# Patient Record
Sex: FEMALE | Race: BLACK OR AFRICAN AMERICAN
[De-identification: names, ages, dates, MRNs, and addresses within clinical notes are randomized per-mention and may not be internally consistent; named-entity substitution may affect disease eponyms.]

---

## 2017-03-08 ENCOUNTER — HOSPITAL ENCOUNTER (OUTPATIENT)
Dept: HOSPITAL 84 - D.LDO | Age: 27
Discharge: HOME | End: 2017-03-08
Attending: OBSTETRICS & GYNECOLOGY
Payer: MEDICAID

## 2017-03-08 VITALS — BODY MASS INDEX: 39.7 KG/M2

## 2017-03-08 DIAGNOSIS — R11.2: ICD-10-CM

## 2017-03-08 DIAGNOSIS — Z3A.30: ICD-10-CM

## 2017-03-08 DIAGNOSIS — Z34.03: Primary | ICD-10-CM

## 2017-03-08 LAB
ANION GAP SERPL CALC-SCNC: 12.5 MMOL/L (ref 8–16)
APPEARANCE UR: CLEAR
BASOPHILS NFR BLD AUTO: 0.1 % (ref 0–2)
BILIRUB SERPL-MCNC: NEGATIVE MG/DL
BUN SERPL-MCNC: 8 MG/DL (ref 7–18)
CALCIUM SERPL-MCNC: 8 MG/DL (ref 8.5–10.1)
CHLORIDE SERPL-SCNC: 102 MMOL/L (ref 98–107)
CO2 SERPL-SCNC: 24.6 MMOL/L (ref 21–32)
COLOR UR: YELLOW
CREAT SERPL-MCNC: 0.5 MG/DL (ref 0.6–1.3)
EOSINOPHIL NFR BLD: 0.6 % (ref 0–7)
ERYTHROCYTE [DISTWIDTH] IN BLOOD BY AUTOMATED COUNT: 13.9 % (ref 11.5–14.5)
FIBRONECTIN FETAL VAG QL: NEGATIVE
GLUCOSE SERPL-MCNC: 79 MG/DL (ref 74–106)
GLUCOSE SERPL-MCNC: NEGATIVE MG/DL
HCT VFR BLD CALC: 36.4 % (ref 36–48)
HGB BLD-MCNC: 13.1 G/DL (ref 12–16)
IMM GRANULOCYTES NFR BLD: 0.5 % (ref 0–5)
KETONES UR STRIP-MCNC: NEGATIVE MG/DL
LEUKOCYTE ESTERASE: NEGATIVE
LYMPHOCYTES NFR BLD AUTO: 6.5 % (ref 15–50)
MCH RBC QN AUTO: 31.6 PG (ref 26–34)
MCHC RBC AUTO-ENTMCNC: 36 G/DL (ref 31–37)
MCV RBC: 87.9 FL (ref 80–100)
MONOCYTES NFR BLD: 6.4 % (ref 2–11)
NEUTROPHILS NFR BLD AUTO: 85.9 % (ref 40–80)
NITRITE UR-MCNC: NEGATIVE MG/ML
OSMOLALITY SERPL CALC.SUM OF ELEC: 266 MOSM/KG (ref 275–300)
PH UR STRIP: 7 [PH] (ref 5–6)
PLATELET # BLD: 190 10X3/UL (ref 130–400)
PMV BLD AUTO: 11.3 FL (ref 7.4–10.4)
POTASSIUM SERPL-SCNC: 4.1 MMOL/L (ref 3.5–5.1)
PROT UR-MCNC: NEGATIVE MG/DL
RBC # BLD AUTO: 4.14 10X6/UL (ref 4–5.4)
SODIUM SERPL-SCNC: 135 MMOL/L (ref 136–145)
SP GR UR STRIP: 1 (ref 1–1.02)
UROBILINOGEN UR-MCNC: NORMAL MG/DL
WBC # BLD AUTO: 12.4 10X3/UL (ref 4.8–10.8)

## 2017-03-22 ENCOUNTER — HOSPITAL ENCOUNTER (OUTPATIENT)
Dept: HOSPITAL 84 - D.LDO | Age: 27
Discharge: HOME | End: 2017-03-22
Attending: OBSTETRICS & GYNECOLOGY
Payer: MEDICAID

## 2017-03-22 VITALS — BODY MASS INDEX: 39.7 KG/M2

## 2017-03-22 DIAGNOSIS — O26.899: Primary | ICD-10-CM

## 2017-03-22 LAB
APPEARANCE UR: (no result)
BACTERIA #/AREA URNS HPF: (no result) /HPF
BILIRUB SERPL-MCNC: NEGATIVE MG/DL
COLOR UR: YELLOW
GLUCOSE SERPL-MCNC: NEGATIVE MG/DL
KETONES UR STRIP-MCNC: NEGATIVE MG/DL
LEUKOCYTE ESTERASE: (no result)
MUCOUS THREADS #/AREA URNS LPF: (no result) /LPF
NITRITE UR-MCNC: NEGATIVE MG/ML
PH UR STRIP: 9 [PH] (ref 5–6)
PROT UR-MCNC: NEGATIVE MG/DL
RBC #/AREA URNS HPF: (no result) /HPF (ref 0–5)
SP GR UR STRIP: 1 (ref 1–1.02)
SQUAMOUS #/AREA URNS HPF: (no result) /HPF (ref 0–5)
UROBILINOGEN UR-MCNC: NORMAL MG/DL
WBC #/AREA URNS HPF: (no result) /HPF (ref 0–5)

## 2017-04-21 ENCOUNTER — HOSPITAL ENCOUNTER (INPATIENT)
Dept: HOSPITAL 84 - D.LDO | Age: 27
LOS: 1 days | Discharge: HOME | End: 2017-04-22
Attending: OBSTETRICS & GYNECOLOGY | Admitting: OBSTETRICS & GYNECOLOGY
Payer: MEDICAID

## 2017-04-21 VITALS
HEIGHT: 60 IN | WEIGHT: 203 LBS | BODY MASS INDEX: 39.85 KG/M2 | WEIGHT: 203 LBS | BODY MASS INDEX: 39.85 KG/M2 | HEIGHT: 60 IN

## 2017-04-21 VITALS — SYSTOLIC BLOOD PRESSURE: 130 MMHG | DIASTOLIC BLOOD PRESSURE: 75 MMHG

## 2017-04-21 DIAGNOSIS — A59.9: ICD-10-CM

## 2017-04-21 DIAGNOSIS — Z3A.36: ICD-10-CM

## 2017-04-21 LAB
ERYTHROCYTE [DISTWIDTH] IN BLOOD BY AUTOMATED COUNT: 13.5 % (ref 11.5–14.5)
HCT VFR BLD CALC: 33.2 % (ref 36–48)
HGB BLD-MCNC: 12.2 G/DL (ref 12–16)
MCH RBC QN AUTO: 31.3 PG (ref 26–34)
MCHC RBC AUTO-ENTMCNC: 36.7 G/DL (ref 31–37)
MCV RBC: 85.1 FL (ref 80–100)
PLATELET # BLD: 200 10X3/UL (ref 130–400)
PMV BLD AUTO: 12 FL (ref 7.4–10.4)
RBC # BLD AUTO: 3.9 10X6/UL (ref 4–5.4)
WBC # BLD AUTO: 10.7 10X3/UL (ref 4.8–10.8)

## 2017-04-22 VITALS — DIASTOLIC BLOOD PRESSURE: 70 MMHG | SYSTOLIC BLOOD PRESSURE: 129 MMHG

## 2017-04-22 VITALS — SYSTOLIC BLOOD PRESSURE: 119 MMHG | DIASTOLIC BLOOD PRESSURE: 74 MMHG

## 2017-04-22 NOTE — NUR
PT UPDATED ON PLAN OF CARE. BEDSIDE REPORT GIVEN TO DEBI OROSCO RN. PT
INSTRUCTED ON USE OF DERMAPLAST, EPIFOAM, AND TUXS PADS. VERBALIZED
UNDERSTANDING. INSTRUCTED ON SITZ BATH, VERBALIZED UNDERSTANDING, BUT REQUEST
DEMONSTRATION DURING NEXT VOID. STATES THAT SHE DOES NOT NEED TO VOID AT THIS
TIME THAT SHE LAST VOIDED AT 0600 WITHOUT DIFFICULTY. PT WILL NOTIFY RN WHEN
SHE NEEDS TO VOID AGAIN.

## 2017-04-22 NOTE — NUR
PATIENT WHEELED OUT TO WAITING CAR AFTER REVIEWING HER DISCHARGE PAPERWORK
AGAIN. SHE HAS TUCKS AND EPIFOAM, PERIPADS AND DERMOBLAST. SHE DENIES
UNCONTROLLED PAIN AND IS ANXIOUS TO GO BE WITH HER BABY AT [facility].
VICTORINO ACCOMPANIES HER AND SIGNS NURSERY PAPERWORK WITH HER PRIOR TO LEAVING. SHE
STATES THAT HER BLEEDING IS LIKE THAT OF A PERIOD AND SHE HAS SOME PERIPADS.
SHE HAS PRESCRIPTIONS FOR PAIN MEDICATIONS AND BREASTFEEDING MATERIAL TO READ.
SHE UNDERSTANDS NOT TO SLEEP WITH HER INFANT WHEN SHE GETS HOME AND IS UP AD
FADI AROUND THE ROOM.

## 2017-04-22 NOTE — NUR
AMBULATES TO DESK, ASKS RN IF SHE CAN GO INTO [facility] TO SEE INFANT. RN TAKES PT
TO [facility] AND SHOWS HER THE BUTTON TO PUSH TO LET [facility] STAFF KNOW SHE AT THE DOOR.
DR. BAUTISTA ASSESSING INFANT AND DISCUSSING PLAN OF CARE AND POSSILBE TRANSFER
TO [facility] [place] [facility] WITH PT. AT THIS TIME.

## 2017-04-22 NOTE — NUR
SHIFT ASSESSMENT COMPLETED.  VERBAL AND WRITTEN INFORMATION GIVEN REGARDING
TDAP.  STARTED DC TEACHING TO INCLUDE ROUTINE PP CARE, PP DEPRESSION, S&S
DEPRESSION, SITZ BATH, MEDICATION ADMINISTRATION AND USE FOR MOTRIN AND
PERCOCET, PERINEAL/LACERATION CARE.  VERBAL AND WRITTEN INFORMATION GIVEN ON
ABOVE ALONG WITH COMMUNITY RESOURCE INFORMATION AND CAR SAFETY INFORMATION.
DESIRES TDAP AND ASKED FOR PAIN MEDICATION FOR PERINEAL PAIN.  DISCUSSED
MEDICATION OPTIONS.  DESIRES PERCOCET 10MG.

## 2017-04-22 NOTE — NUR
PT TO ROOM FOR 1273 FOR CONTINUED PP CARE, FOLLOWING VISITING INFANT IN LEVEL
2 [facility]. VSS. FUNDUS REMAINS FIRM, U1 WITH MODERATE AMT RUBRA LOCHIA, NOT CLOTS.
ORIENTED TO ROOM. BED PLACED IN LOW POSITION WITH UPPER SIDE RAILS RAISED X2.
CL AND PHONE WITHIN REACH. SANDWICH TRAY, PUDDING, JELLO, AND SODA GIVEN PER
REQUEST. DENIES ADDITIONAL NEEDS AT THIS TIME.

## 2017-04-22 NOTE — NUR
PT TO [facility] REPORTS THAT INFANT WILL BE TRANSFERRED TO [facility]. REQUESTING TO
BE D/C'D IF POSSIBLE SO THAT SHE CAN GO WITH INFANT. SANG CONNELLY RN SPOKE WITH
DR. PRABHAKAR WHILE GIVING REPORT ON ANOTHER PT WITH ORDERS REC'D THAT PT MAY
DISCHARGE TO GO WITH INFANT.

## 2017-04-22 NOTE — NUR
PT UP AND AMBULATORY IN ROOM, TO BR, VOIDS PER SELF, PERICARE DONE PER SELF
USING BETADINE/WARM WATER, EPIFOAM, AND TUCKS.  PERIPADS/PANTIES PROVIDED.
EDUCATION PROVIDED USING PERICARE ITEMS.  PT DENIES OTHER NEEDS AT THIS TIME.

## 2017-04-22 NOTE — NUR
PT. UP WALKING IN HALLWAY. EXPRESSES DESIRE TO DISCHARGE DUE TO INFANT BEING
TRANSFERRED TO [facility] IN [place]. DR. PRABHAKAR CALLED AND INFORMED OF PT.
REQUEST TO DISCHARGE. ORDER RECEIVED.

## 2017-04-22 NOTE — NUR
CURRENTLY IN NURSERY.  INFANT IS BEING TRANSFERRED TO [facility] THIS AM.  WILL
COMPLETE SHIFT ASSESSMENT AND PREPARE FOR DC WHEN SHE RETURNS TO ROOM.

## 2017-11-25 ENCOUNTER — HOSPITAL ENCOUNTER (EMERGENCY)
Dept: HOSPITAL 84 - D.ER | Age: 27
Discharge: HOME | End: 2017-11-25
Payer: MEDICAID

## 2017-11-25 VITALS — BODY MASS INDEX: 39.7 KG/M2

## 2017-11-25 DIAGNOSIS — J02.9: Primary | ICD-10-CM

## 2018-01-05 ENCOUNTER — HOSPITAL ENCOUNTER (OUTPATIENT)
Dept: HOSPITAL 84 - D.OPS | Age: 28
Discharge: HOME | End: 2018-01-05
Attending: ORTHOPAEDIC SURGERY
Payer: MEDICAID

## 2018-01-05 VITALS — SYSTOLIC BLOOD PRESSURE: 112 MMHG | DIASTOLIC BLOOD PRESSURE: 70 MMHG

## 2018-01-05 VITALS — WEIGHT: 178 LBS | HEIGHT: 60 IN | BODY MASS INDEX: 34.95 KG/M2

## 2018-01-05 DIAGNOSIS — M65.4: Primary | ICD-10-CM

## 2018-01-05 DIAGNOSIS — Z01.812: ICD-10-CM

## 2018-01-05 LAB
ERYTHROCYTE [DISTWIDTH] IN BLOOD BY AUTOMATED COUNT: 14.4 % (ref 11.5–14.5)
HCG UR QL: NEGATIVE
HCT VFR BLD CALC: 37.2 % (ref 36–48)
HGB BLD-MCNC: 13.6 G/DL (ref 12–16)
MCH RBC QN AUTO: 30.6 PG (ref 26–34)
MCHC RBC AUTO-ENTMCNC: 36.6 G/DL (ref 31–37)
MCV RBC: 83.8 FL (ref 80–100)
PLATELET # BLD: 250 10X3/UL (ref 130–400)
PMV BLD AUTO: 11.7 FL (ref 7.4–10.4)
RBC # BLD AUTO: 4.44 10X6/UL (ref 4–5.4)
WBC # BLD AUTO: 6.4 10X3/UL (ref 4.8–10.8)

## 2020-12-01 NOTE — NUR
PERCOCET 10 MG GIVEN PO FOR RELIEF OF PERINEAL PAIN.  ALSO HAS DERMOPLAST,
TUCKS AND EPIFOAM TO USE AND SHE SAYS SHE IS AWARE HOW TO USE THE MEDICATION.
SHE IS PACKED AND READY FOR DC.  WAITING ON NURSERY TO COMPLETE PAPERWORK. Right inferior pubic ramus fracture  will continue physical therapy  as tolerated  Pain has been well controlled